# Patient Record
Sex: MALE | Race: WHITE
[De-identification: names, ages, dates, MRNs, and addresses within clinical notes are randomized per-mention and may not be internally consistent; named-entity substitution may affect disease eponyms.]

---

## 2017-06-06 PROBLEM — Z00.00 ENCOUNTER FOR PREVENTIVE HEALTH EXAMINATION: Status: ACTIVE | Noted: 2017-06-06

## 2017-06-07 ENCOUNTER — APPOINTMENT (OUTPATIENT)
Dept: PSYCHIATRY | Facility: CLINIC | Age: 22
End: 2017-06-07

## 2017-06-07 RX ORDER — HUMAN INSULIN 100 [IU]/ML
100 INJECTION, SUSPENSION SUBCUTANEOUS
Qty: 10 | Refills: 0 | Status: COMPLETED | COMMUNITY
Start: 2016-12-29

## 2017-06-07 RX ORDER — INSULIN ASPART 100 [IU]/ML
100 INJECTION, SOLUTION INTRAVENOUS; SUBCUTANEOUS
Qty: 90 | Refills: 0 | Status: ACTIVE | COMMUNITY
Start: 2017-04-24

## 2017-06-22 ENCOUNTER — APPOINTMENT (OUTPATIENT)
Dept: PSYCHIATRY | Facility: CLINIC | Age: 22
End: 2017-06-22

## 2017-07-06 ENCOUNTER — APPOINTMENT (OUTPATIENT)
Dept: PSYCHIATRY | Facility: CLINIC | Age: 22
End: 2017-07-06

## 2017-08-03 ENCOUNTER — APPOINTMENT (OUTPATIENT)
Dept: PSYCHIATRY | Facility: CLINIC | Age: 22
End: 2017-08-03
Payer: COMMERCIAL

## 2017-08-03 PROCEDURE — 96127 BRIEF EMOTIONAL/BEHAV ASSMT: CPT

## 2017-08-03 PROCEDURE — 99214 OFFICE O/P EST MOD 30 MIN: CPT

## 2017-08-24 ENCOUNTER — APPOINTMENT (OUTPATIENT)
Dept: PSYCHIATRY | Facility: CLINIC | Age: 22
End: 2017-08-24

## 2017-08-30 ENCOUNTER — EMERGENCY (EMERGENCY)
Facility: HOSPITAL | Age: 22
LOS: 1 days | Discharge: HOME | End: 2017-08-30
Attending: EMERGENCY MEDICINE | Admitting: EMERGENCY MEDICINE
Payer: SELF-PAY

## 2017-08-30 VITALS
HEART RATE: 77 BPM | TEMPERATURE: 99 F | DIASTOLIC BLOOD PRESSURE: 76 MMHG | RESPIRATION RATE: 18 BRPM | OXYGEN SATURATION: 98 % | SYSTOLIC BLOOD PRESSURE: 135 MMHG

## 2017-08-30 PROCEDURE — 72100 X-RAY EXAM L-S SPINE 2/3 VWS: CPT | Mod: 26

## 2017-08-30 PROCEDURE — 99284 EMERGENCY DEPT VISIT MOD MDM: CPT

## 2017-08-30 PROCEDURE — 72125 CT NECK SPINE W/O DYE: CPT | Mod: 26

## 2017-08-30 PROCEDURE — 72125 CT NECK SPINE W/O DYE: CPT

## 2017-08-30 PROCEDURE — 99284 EMERGENCY DEPT VISIT MOD MDM: CPT | Mod: 25

## 2017-08-30 PROCEDURE — 12345: CPT | Mod: NC

## 2017-08-30 PROCEDURE — 72100 X-RAY EXAM L-S SPINE 2/3 VWS: CPT

## 2017-08-30 RX ORDER — IBUPROFEN 200 MG
600 TABLET ORAL ONCE
Qty: 0 | Refills: 0 | Status: COMPLETED | OUTPATIENT
Start: 2017-08-30 | End: 2017-08-30

## 2017-08-30 RX ORDER — CYCLOBENZAPRINE HYDROCHLORIDE 10 MG/1
1 TABLET, FILM COATED ORAL
Qty: 20 | Refills: 0 | OUTPATIENT
Start: 2017-08-30

## 2017-08-30 RX ADMIN — Medication 600 MILLIGRAM(S): at 11:44

## 2017-08-30 NOTE — ED ADULT NURSE REASSESSMENT NOTE - NS ED NURSE REASSESS COMMENT FT1
Patient has insulin pump. His pump is 1 time use. Finger stick performed on hospital glucometer read 250. Patient states he bolused himself after blood sugar test. Patient has no spare insulin pumps. He is to go for imaging but has no spare pumps. Paged endocrine fellow and endocrine  awaiting response. MD sr aware. Patient is filling out proper insulin pump forms. Patient to wait for imaging until spare pump is brought/endocrine replies. Will continue to monitor.
1153 patient removed pump and was taken immediately to x-ray. CT scan also made aware and Ken in CT states patient can go from x-ray to CT scan. Will re-check finger stick when patient returns.
Patient applied new insulin pump to right arm at 1220. Finger stick performed on hospital glucometer read 234.
attempted to page MD Cedillo in endocrine twice and have not received a call back. Called diabetes educator total of 4 times and left 1 voicemail for a call back. Patient still pending imaging. Still has pump in place and does not yet have a spare. Patient filled out appropriate forms. Nurse manager in ED Dianne made aware and is escalating.
diabetes nurse educator at patients bedside.

## 2017-08-30 NOTE — ED ADULT NURSE NOTE - CHPI ED SYMPTOMS NEG
no difficulty bearing weight/no crying/no decreased eating/drinking/no bruising/no disorientation/no fussiness/no sleeping issues/no laceration/no loss of consciousness
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
## 2017-08-30 NOTE — ED PROVIDER NOTE - PHYSICAL EXAMINATION
Attending note. Patient is alert and in no acute distress. Head is normocephalic and atraumatic. Pupils are 3 mm equal reactive. She has paracervical and midline cervical tenderness. Patient is in a cervical collar. This has tenderness in the paralumbar area and midline of the lumbar spine. There is no CVA tenderness. Chest and ribs and nontender. Abdomen is soft and nontender. There is no tenderness over the liver or spleen. Pelvis is nontender. Patient has full range of motion of the upper extremity without pain or tenderness. Examination of the lower extremity reveals no deformities or signs of trauma. She has full range of motion of the lower extremity with pain in the medial aspect of the right knee. Patient's pain is increased with valgus stress of the knee. Lachman test is negative. Quads, quad tendon, patella, patella tendon and nontender. Ankle and foot are nontender. Patient has slight paresthesia in the right great toe.

## 2017-09-19 ENCOUNTER — APPOINTMENT (OUTPATIENT)
Dept: PSYCHIATRY | Facility: CLINIC | Age: 22
End: 2017-09-19
Payer: COMMERCIAL

## 2017-09-19 PROCEDURE — 99214 OFFICE O/P EST MOD 30 MIN: CPT

## 2017-09-19 RX ORDER — MELOXICAM 15 MG/1
15 TABLET ORAL
Qty: 30 | Refills: 0 | Status: ACTIVE | COMMUNITY
Start: 2017-09-15

## 2017-09-19 RX ORDER — CYCLOBENZAPRINE HYDROCHLORIDE 10 MG/1
10 TABLET, FILM COATED ORAL
Qty: 20 | Refills: 0 | Status: ACTIVE | COMMUNITY
Start: 2017-08-30

## 2017-09-19 RX ORDER — SERTRALINE HYDROCHLORIDE 50 MG/1
50 TABLET, FILM COATED ORAL
Qty: 30 | Refills: 0 | Status: COMPLETED | COMMUNITY
Start: 2017-06-07

## 2017-09-19 RX ORDER — MOMETASONE 50 UG/1
50 SPRAY, METERED NASAL
Qty: 51 | Refills: 0 | Status: COMPLETED | COMMUNITY
Start: 2017-09-17

## 2017-10-03 ENCOUNTER — APPOINTMENT (OUTPATIENT)
Dept: PSYCHIATRY | Facility: CLINIC | Age: 22
End: 2017-10-03
Payer: COMMERCIAL

## 2017-10-03 PROCEDURE — 99214 OFFICE O/P EST MOD 30 MIN: CPT

## 2017-11-07 ENCOUNTER — APPOINTMENT (OUTPATIENT)
Dept: PSYCHIATRY | Facility: CLINIC | Age: 22
End: 2017-11-07
Payer: COMMERCIAL

## 2017-11-07 PROCEDURE — 99214 OFFICE O/P EST MOD 30 MIN: CPT

## 2017-11-30 ENCOUNTER — APPOINTMENT (OUTPATIENT)
Dept: PSYCHIATRY | Facility: CLINIC | Age: 22
End: 2017-11-30
Payer: COMMERCIAL

## 2017-11-30 PROCEDURE — 99214 OFFICE O/P EST MOD 30 MIN: CPT

## 2017-11-30 RX ORDER — SERTRALINE HYDROCHLORIDE 100 MG/1
100 TABLET, FILM COATED ORAL
Qty: 15 | Refills: 0 | Status: COMPLETED | COMMUNITY
Start: 2017-06-07 | End: 2017-11-30

## 2017-11-30 RX ORDER — TRAZODONE HYDROCHLORIDE 50 MG/1
50 TABLET ORAL
Qty: 30 | Refills: 0 | Status: COMPLETED | COMMUNITY
Start: 2017-09-19 | End: 2017-11-30

## 2017-11-30 RX ORDER — BUPROPION HYDROCHLORIDE 300 MG/1
300 TABLET, EXTENDED RELEASE ORAL
Qty: 30 | Refills: 0 | Status: COMPLETED | COMMUNITY
Start: 2017-10-03 | End: 2017-11-30

## 2017-12-21 ENCOUNTER — APPOINTMENT (OUTPATIENT)
Dept: PSYCHIATRY | Facility: CLINIC | Age: 22
End: 2017-12-21
Payer: COMMERCIAL

## 2017-12-21 PROCEDURE — 99214 OFFICE O/P EST MOD 30 MIN: CPT

## 2018-01-19 ENCOUNTER — RX RENEWAL (OUTPATIENT)
Age: 23
End: 2018-01-19

## 2018-01-31 ENCOUNTER — APPOINTMENT (OUTPATIENT)
Dept: PSYCHIATRY | Facility: CLINIC | Age: 23
End: 2018-01-31
Payer: COMMERCIAL

## 2018-01-31 PROCEDURE — 99214 OFFICE O/P EST MOD 30 MIN: CPT

## 2018-01-31 RX ORDER — VENLAFAXINE HYDROCHLORIDE 37.5 MG/1
37.5 CAPSULE, EXTENDED RELEASE ORAL
Qty: 30 | Refills: 0 | Status: COMPLETED | COMMUNITY
Start: 2017-11-30 | End: 2018-01-31

## 2018-01-31 RX ORDER — METHYLPREDNISOLONE 4 MG/1
4 TABLET ORAL
Qty: 21 | Refills: 0 | Status: COMPLETED | COMMUNITY
Start: 2017-11-02

## 2018-01-31 RX ORDER — BUPROPION HYDROCHLORIDE 150 MG/1
150 TABLET, EXTENDED RELEASE ORAL
Qty: 30 | Refills: 0 | Status: COMPLETED | COMMUNITY
Start: 2017-10-03

## 2018-01-31 RX ORDER — SCOPOLAMINE 1.5 MG/1
1 PATCH, EXTENDED RELEASE TRANSDERMAL
Qty: 15 | Refills: 0 | Status: COMPLETED | COMMUNITY
Start: 2018-01-03

## 2018-02-28 ENCOUNTER — APPOINTMENT (OUTPATIENT)
Dept: PSYCHIATRY | Facility: CLINIC | Age: 23
End: 2018-02-28

## 2018-03-15 ENCOUNTER — APPOINTMENT (OUTPATIENT)
Dept: PSYCHIATRY | Facility: CLINIC | Age: 23
End: 2018-03-15
Payer: COMMERCIAL

## 2018-03-15 PROCEDURE — 99214 OFFICE O/P EST MOD 30 MIN: CPT

## 2018-04-03 ENCOUNTER — APPOINTMENT (OUTPATIENT)
Dept: PSYCHIATRY | Facility: CLINIC | Age: 23
End: 2018-04-03
Payer: COMMERCIAL

## 2018-04-03 PROCEDURE — 99214 OFFICE O/P EST MOD 30 MIN: CPT

## 2018-04-03 RX ORDER — VENLAFAXINE HYDROCHLORIDE 150 MG/1
150 CAPSULE, EXTENDED RELEASE ORAL
Qty: 30 | Refills: 1 | Status: ACTIVE | COMMUNITY
Start: 2017-12-21 | End: 1900-01-01

## 2018-04-03 RX ORDER — VENLAFAXINE HYDROCHLORIDE 75 MG/1
75 CAPSULE, EXTENDED RELEASE ORAL
Qty: 30 | Refills: 0 | Status: DISCONTINUED | COMMUNITY
Start: 2017-12-21

## 2018-04-03 RX ORDER — DOXEPIN HYDROCHLORIDE 10 MG/1
10 CAPSULE ORAL
Qty: 30 | Refills: 0 | Status: ACTIVE | COMMUNITY
Start: 2018-03-15 | End: 1900-01-01

## 2018-06-04 ENCOUNTER — APPOINTMENT (OUTPATIENT)
Dept: PSYCHIATRY | Facility: CLINIC | Age: 23
End: 2018-06-04
Payer: COMMERCIAL

## 2018-06-04 DIAGNOSIS — G47.00 INSOMNIA, UNSPECIFIED: ICD-10-CM

## 2018-06-04 DIAGNOSIS — F34.1 DYSTHYMIC DISORDER: ICD-10-CM

## 2018-06-04 DIAGNOSIS — F40.10 SOCIAL PHOBIA, UNSPECIFIED: ICD-10-CM

## 2018-06-04 PROCEDURE — 99214 OFFICE O/P EST MOD 30 MIN: CPT

## 2018-06-04 RX ORDER — VENLAFAXINE HYDROCHLORIDE 225 MG/1
225 TABLET, EXTENDED RELEASE ORAL
Qty: 30 | Refills: 1 | Status: ACTIVE | COMMUNITY
Start: 2018-06-04 | End: 1900-01-01

## 2018-07-02 ENCOUNTER — APPOINTMENT (OUTPATIENT)
Dept: PSYCHIATRY | Facility: CLINIC | Age: 23
End: 2018-07-02

## 2023-12-08 NOTE — ED ADULT NURSE NOTE - CARDIO ASSESSMENT
Copy made and placed in Dec  bin for scanning. Original Sent to pts group home in the envelope that was provided by pt and sent with forms   WDL
